# Patient Record
Sex: FEMALE | URBAN - METROPOLITAN AREA
[De-identification: names, ages, dates, MRNs, and addresses within clinical notes are randomized per-mention and may not be internally consistent; named-entity substitution may affect disease eponyms.]

---

## 2020-01-24 ENCOUNTER — NURSE TRIAGE (OUTPATIENT)
Dept: ADMINISTRATIVE | Facility: CLINIC | Age: 20
End: 2020-01-24

## 2020-01-24 NOTE — TELEPHONE ENCOUNTER
"In her "private area", she's having numbness that began about a year ago, after getting a piercing to her clitoral de la garza.  She has since taken out the piercing, approximately 5 months ago, but symptoms are getting progressively worse.  No hx of back injury.  She has seen her OBGYN about it, but she had no answers for her, and said she'd never heard of this before.  She is asking who she should see about this.  I advised she should see another OBGYN for a 2nd opinion, and they can evaluate and make recommendations or referrals. Gave her information on providers for uninsured patients in the [place] area.   Could consider neurology or urology, as well.    Reason for Disposition   Health Information question, no triage required and triager able to answer question    Additional Information   Negative: Nursing judgment   Negative: Nursing judgment   Negative: Nursing judgment   Negative: Nursing judgment   Negative: [1] Caller is not with the adult (patient) AND [2] reporting urgent symptoms   Negative: Lab result questions   Negative: Medication questions   Negative: Caller cannot be reached by phone   Negative: Caller has already spoken to PCP or another triager   Negative: RN needs further essential information from caller in order to complete triage   Negative: Requesting regular office appointment   Negative: [1] Caller requesting NON-URGENT health information AND [2] PCP's office is the best resource    Answer Assessment - Initial Assessment Questions  1. REASON FOR CALL or QUESTION: "What is your reason for calling today?" or "How can I best help you?" or "What question do you have that I can help answer?"      Numbness to her clitoral area x 1 year.    Protocols used: ST INFORMATION ONLY CALL-A-AH, NO PROTOCOL AVAILABLE - INFORMATION ONLY-A-OH      "